# Patient Record
Sex: FEMALE | Race: WHITE | NOT HISPANIC OR LATINO | Employment: FULL TIME | URBAN - METROPOLITAN AREA
[De-identification: names, ages, dates, MRNs, and addresses within clinical notes are randomized per-mention and may not be internally consistent; named-entity substitution may affect disease eponyms.]

---

## 2017-05-01 ENCOUNTER — TRANSCRIBE ORDERS (OUTPATIENT)
Dept: ADMINISTRATIVE | Facility: HOSPITAL | Age: 22
End: 2017-05-01

## 2017-05-01 ENCOUNTER — ALLSCRIPTS OFFICE VISIT (OUTPATIENT)
Dept: OTHER | Facility: OTHER | Age: 22
End: 2017-05-01

## 2017-05-01 DIAGNOSIS — M25.512 PAIN IN LEFT SHOULDER: ICD-10-CM

## 2017-05-01 DIAGNOSIS — M25.512 LEFT SHOULDER PAIN, UNSPECIFIED CHRONICITY: Primary | ICD-10-CM

## 2017-06-12 ENCOUNTER — HOSPITAL ENCOUNTER (OUTPATIENT)
Dept: RADIOLOGY | Facility: HOSPITAL | Age: 22
Discharge: HOME | End: 2017-06-12
Attending: ORTHOPAEDIC SURGERY
Payer: COMMERCIAL

## 2017-06-12 DIAGNOSIS — M25.512 PAIN IN LEFT SHOULDER: ICD-10-CM

## 2017-06-12 DIAGNOSIS — M25.512 LEFT SHOULDER PAIN, UNSPECIFIED CHRONICITY: ICD-10-CM

## 2017-06-12 PROCEDURE — 23350 INJECTION FOR SHOULDER X-RAY: CPT

## 2017-06-12 PROCEDURE — 77002 NEEDLE LOCALIZATION BY XRAY: CPT

## 2017-06-12 PROCEDURE — 73222 MRI JOINT UPR EXTREM W/DYE: CPT

## 2017-06-12 PROCEDURE — A9585 GADOBUTROL INJECTION: HCPCS | Performed by: ORTHOPAEDIC SURGERY

## 2017-06-12 RX ORDER — LIDOCAINE HYDROCHLORIDE 10 MG/ML
5 INJECTION, SOLUTION INFILTRATION; PERINEURAL
Status: COMPLETED | OUTPATIENT
Start: 2017-06-12 | End: 2017-06-12

## 2017-06-12 RX ORDER — 0.9 % SODIUM CHLORIDE 0.9 %
5 VIAL (ML) INJECTION
Status: COMPLETED | OUTPATIENT
Start: 2017-06-12 | End: 2017-06-12

## 2017-06-12 RX ADMIN — LIDOCAINE HYDROCHLORIDE 2 ML: 10 INJECTION, SOLUTION INFILTRATION; PERINEURAL at 15:45

## 2017-06-12 RX ADMIN — GADOBUTROL 0.2 ML: 604.72 INJECTION INTRAVENOUS at 16:56

## 2017-06-12 RX ADMIN — IOHEXOL 3 ML: 300 INJECTION, SOLUTION INTRAVENOUS at 15:45

## 2017-06-12 RX ADMIN — SODIUM CHLORIDE 3 ML: 9 INJECTION, SOLUTION INTRAMUSCULAR; INTRAVENOUS; SUBCUTANEOUS at 15:45

## 2017-07-03 ENCOUNTER — ALLSCRIPTS OFFICE VISIT (OUTPATIENT)
Dept: OTHER | Facility: OTHER | Age: 22
End: 2017-07-03

## 2017-07-03 DIAGNOSIS — M75.42 IMPINGEMENT SYNDROME OF LEFT SHOULDER: ICD-10-CM

## 2017-10-30 ENCOUNTER — TRANSCRIBE ORDERS (OUTPATIENT)
Dept: ADMINISTRATIVE | Facility: HOSPITAL | Age: 22
End: 2017-10-30

## 2017-10-30 DIAGNOSIS — J01.90 ACUTE NON-RECURRENT SINUSITIS, UNSPECIFIED LOCATION: Primary | ICD-10-CM

## 2017-11-24 ENCOUNTER — HOSPITAL ENCOUNTER (OUTPATIENT)
Dept: CT IMAGING | Facility: CLINIC | Age: 22
Discharge: HOME | End: 2017-11-24
Payer: COMMERCIAL

## 2017-11-24 DIAGNOSIS — J01.90 ACUTE NON-RECURRENT SINUSITIS, UNSPECIFIED LOCATION: ICD-10-CM

## 2017-11-24 PROCEDURE — 70486 CT MAXILLOFACIAL W/O DYE: CPT

## 2018-01-13 VITALS
DIASTOLIC BLOOD PRESSURE: 76 MMHG | SYSTOLIC BLOOD PRESSURE: 117 MMHG | WEIGHT: 48.38 LBS | HEART RATE: 77 BPM | BODY MASS INDEX: 8.3 KG/M2

## 2018-01-14 VITALS
WEIGHT: 149.5 LBS | BODY MASS INDEX: 25.66 KG/M2 | HEART RATE: 75 BPM | DIASTOLIC BLOOD PRESSURE: 76 MMHG | SYSTOLIC BLOOD PRESSURE: 108 MMHG

## 2020-07-20 ENCOUNTER — TELEPHONE (OUTPATIENT)
Dept: OTOLARYNGOLOGY | Facility: CLINIC | Age: 25
End: 2020-07-20

## 2020-07-20 NOTE — TELEPHONE ENCOUNTER
Patient called here stating that she needs a f/u appt with Dr Ramila Gonzalez  Patient was told by the [facility] office in [place] to call & get a f/u appt with JCB  However, the patient has called & left several messages for the past two weeks & no one has returned the phone call  Patient would be willing to do a virtual visit with JCB at [facility] and the patient is also willing to do an in-person visit here in [address]  Please contact this patient so that an appointment can be made sooner rather then later

## 2020-07-20 NOTE — TELEPHONE ENCOUNTER
I am not sure why this went to Dr Malachi Jones in the allergy division  Do you need me to see her?   Brannon Jacinto, DO

## 2021-05-24 ENCOUNTER — TELEPHONE (OUTPATIENT)
Dept: ENDOCRINOLOGY | Facility: HOSPITAL | Age: 26
End: 2021-05-24

## 2021-05-24 NOTE — TELEPHONE ENCOUNTER
Pt has 8/25 new patient appt for thyroid  She lives in [place] (her parents live here) and her pcp in [place] believes she should see an Endo ASAP  She also is hoping to try to get pregnant soon so she would like sooner appt  Her recent labs are in [place] thru [facility]  She had them done 4/28 & 5/7  Does she need sooner appt   She is already on Cancellation list

## 2021-05-25 NOTE — TELEPHONE ENCOUNTER
Yes she should probably be seen sooner  Cancellation list sounds good  I can add her on in July  I am not sure I can add her on in June unless there is an opening since I will be covering [facility]  Any openings in other attendings schedule such as CV?

## 2021-05-26 ENCOUNTER — OFFICE VISIT (OUTPATIENT)
Dept: ENDOCRINOLOGY | Facility: HOSPITAL | Age: 26
End: 2021-05-26
Payer: COMMERCIAL

## 2021-05-26 ENCOUNTER — APPOINTMENT (OUTPATIENT)
Dept: LAB | Facility: HOSPITAL | Age: 26
End: 2021-05-26
Attending: INTERNAL MEDICINE
Payer: COMMERCIAL

## 2021-05-26 VITALS
WEIGHT: 155.2 LBS | SYSTOLIC BLOOD PRESSURE: 108 MMHG | DIASTOLIC BLOOD PRESSURE: 72 MMHG | HEIGHT: 64 IN | HEART RATE: 96 BPM | BODY MASS INDEX: 26.5 KG/M2

## 2021-05-26 DIAGNOSIS — E05.90 HYPERTHYROIDISM: Primary | ICD-10-CM

## 2021-05-26 LAB
ALBUMIN SERPL BCP-MCNC: 4.1 G/DL (ref 3.5–5)
ALP SERPL-CCNC: 96 U/L (ref 46–116)
ALT SERPL W P-5'-P-CCNC: 45 U/L (ref 12–78)
ANION GAP SERPL CALCULATED.3IONS-SCNC: 5 MMOL/L (ref 4–13)
AST SERPL W P-5'-P-CCNC: 22 U/L (ref 5–45)
BASOPHILS # BLD AUTO: 0.05 THOUSANDS/ΜL (ref 0–0.1)
BASOPHILS NFR BLD AUTO: 1 % (ref 0–1)
BILIRUB SERPL-MCNC: 0.52 MG/DL (ref 0.2–1)
BUN SERPL-MCNC: 13 MG/DL (ref 5–25)
CALCIUM SERPL-MCNC: 9.6 MG/DL (ref 8.3–10.1)
CHLORIDE SERPL-SCNC: 110 MMOL/L (ref 100–108)
CO2 SERPL-SCNC: 26 MMOL/L (ref 21–32)
CREAT SERPL-MCNC: 0.68 MG/DL (ref 0.6–1.3)
EOSINOPHIL # BLD AUTO: 0.3 THOUSAND/ΜL (ref 0–0.61)
EOSINOPHIL NFR BLD AUTO: 7 % (ref 0–6)
ERYTHROCYTE [DISTWIDTH] IN BLOOD BY AUTOMATED COUNT: 12.9 % (ref 11.6–15.1)
GFR SERPL CREATININE-BSD FRML MDRD: 120 ML/MIN/1.73SQ M
GLUCOSE P FAST SERPL-MCNC: 92 MG/DL (ref 65–99)
HCT VFR BLD AUTO: 43.6 % (ref 34.8–46.1)
HGB BLD-MCNC: 14.2 G/DL (ref 11.5–15.4)
IMM GRANULOCYTES # BLD AUTO: 0.01 THOUSAND/UL (ref 0–0.2)
IMM GRANULOCYTES NFR BLD AUTO: 0 % (ref 0–2)
LYMPHOCYTES # BLD AUTO: 1.76 THOUSANDS/ΜL (ref 0.6–4.47)
LYMPHOCYTES NFR BLD AUTO: 41 % (ref 14–44)
MCH RBC QN AUTO: 29.2 PG (ref 26.8–34.3)
MCHC RBC AUTO-ENTMCNC: 32.6 G/DL (ref 31.4–37.4)
MCV RBC AUTO: 90 FL (ref 82–98)
MONOCYTES # BLD AUTO: 0.33 THOUSAND/ΜL (ref 0.17–1.22)
MONOCYTES NFR BLD AUTO: 8 % (ref 4–12)
NEUTROPHILS # BLD AUTO: 1.84 THOUSANDS/ΜL (ref 1.85–7.62)
NEUTS SEG NFR BLD AUTO: 43 % (ref 43–75)
NRBC BLD AUTO-RTO: 0 /100 WBCS
PLATELET # BLD AUTO: 263 THOUSANDS/UL (ref 149–390)
PMV BLD AUTO: 11.1 FL (ref 8.9–12.7)
POTASSIUM SERPL-SCNC: 4 MMOL/L (ref 3.5–5.3)
PROT SERPL-MCNC: 7.8 G/DL (ref 6.4–8.2)
RBC # BLD AUTO: 4.87 MILLION/UL (ref 3.81–5.12)
SODIUM SERPL-SCNC: 141 MMOL/L (ref 136–145)
T3FREE SERPL-MCNC: 5.43 PG/ML (ref 2.3–4.2)
T4 FREE SERPL-MCNC: 1.21 NG/DL (ref 0.76–1.46)
TSH SERPL DL<=0.05 MIU/L-ACNC: <0.007 UIU/ML (ref 0.36–3.74)
WBC # BLD AUTO: 4.29 THOUSAND/UL (ref 4.31–10.16)

## 2021-05-26 PROCEDURE — 80053 COMPREHEN METABOLIC PANEL: CPT | Performed by: INTERNAL MEDICINE

## 2021-05-26 PROCEDURE — 84443 ASSAY THYROID STIM HORMONE: CPT | Performed by: INTERNAL MEDICINE

## 2021-05-26 PROCEDURE — 36415 COLL VENOUS BLD VENIPUNCTURE: CPT | Performed by: INTERNAL MEDICINE

## 2021-05-26 PROCEDURE — 84445 ASSAY OF TSI GLOBULIN: CPT | Performed by: INTERNAL MEDICINE

## 2021-05-26 PROCEDURE — 99204 OFFICE O/P NEW MOD 45 MIN: CPT | Performed by: INTERNAL MEDICINE

## 2021-05-26 PROCEDURE — 84439 ASSAY OF FREE THYROXINE: CPT | Performed by: INTERNAL MEDICINE

## 2021-05-26 PROCEDURE — 85025 COMPLETE CBC W/AUTO DIFF WBC: CPT | Performed by: INTERNAL MEDICINE

## 2021-05-26 PROCEDURE — 84481 FREE ASSAY (FT-3): CPT | Performed by: INTERNAL MEDICINE

## 2021-05-26 RX ORDER — CETIRIZINE HYDROCHLORIDE 10 MG/1
10 TABLET ORAL DAILY
COMMUNITY

## 2021-05-26 NOTE — PROGRESS NOTES
5/26/2021    Assessment/Plan      Diagnoses and all orders for this visit:    Hyperthyroidism  -     TSH, 3rd generation  -     T3, free  -     T4, free  -     Thyroid stimulating immunoglobulin  -     Comprehensive metabolic panel Lab Collect  -     CBC and differential- Lab Collect    Other orders  -     cetirizine (ZyrTEC) 10 mg tablet; Take 10 mg by mouth daily        Assessment/Plan:  Thyrotoxicosis: T3   Toxicosis given lab work as noted below  My suspicion is this may be driven by Graves disease as the etiology  However we will pursue additional workup in order labs as ordered above  If TSI is elevated, given plans for pregnancy we will start medical therapy  Discussed considerations in terms of medications at different trimesters of pregnancy and also thyroid levels during and before pregnancy  Advised her to continue to hold off on trying for pregnancy at this time until her thyroid levels are improved  Will be in touch with her as results are available  Discussed with her that if TSI is normal we may need to obtain a thyroid uptake and scan  CC: New Patient    History of Present Illness     HPI: Ad Kwon is a 31y o  year old female Presents to establish care for recent diagnosis of hyperthyroidism seen in lab work  She reports that her symptom of concern was mainly joint pain and hand pain  Otherwise overall she is feeling well  There has no appreciable significant fatigue, heat intolerance, tremor, palpitations, tachycardia, anxiety, unintentional weight loss though   She does note at times later in the day energy levels are lower  She denies any neck compressive symptoms  She denies any eye discomfort, vision changes  She denies smoking  There is a family history of hypothyroidism  No known events such as viral illness such as life stressor prior to the symptoms and lab work noted below    Node change in exercise tolerance though she does report sometimes the in the day given energy level will not exercise  She does report having IUD removed for fertility plans in the near future  She was told by her family doctor to avoid pregnancy at this time until thyroid levels are improved  Review of Systems   Constitutional: Positive for fatigue  HENT: Negative for trouble swallowing and voice change  Eyes: Negative for visual disturbance  Respiratory: Negative for shortness of breath  Cardiovascular: Negative for palpitations and leg swelling  Gastrointestinal: Negative for abdominal pain, nausea and vomiting  Endocrine: Negative for polydipsia and polyuria  Musculoskeletal: Positive for arthralgias  Skin: Negative for rash  Neurological: Negative for dizziness, tremors and weakness  Hematological: Negative for adenopathy  Psychiatric/Behavioral: Negative for agitation and confusion  Historical Information   History reviewed  No pertinent past medical history  Past Surgical History:   Procedure Laterality Date    SEPTOPLASTY  02/08/2019     Social History   Social History     Substance and Sexual Activity   Alcohol Use Yes    Frequency: 2-4 times a month     Social History     Substance and Sexual Activity   Drug Use Never     Social History     Tobacco Use   Smoking Status Never Smoker   Smokeless Tobacco Never Used     Family History:   Family History   Problem Relation Age of Onset    Hypertension Mother     Hyperlipidemia Mother     Asthma Mother     Hypertension Father     Hyperlipidemia Father        Meds/Allergies   Current Outpatient Medications   Medication Sig Dispense Refill    cetirizine (ZyrTEC) 10 mg tablet Take 10 mg by mouth daily       No current facility-administered medications for this visit  Allergies   Allergen Reactions    Amitriptyline      Tremors         Objective   Vitals: Blood pressure 108/72, pulse 96, height 5' 4" (1 626 m), weight 70 4 kg (155 lb 3 2 oz)    Invasive Devices     None                 Physical Exam  Constitutional:       General: She is not in acute distress  Appearance: She is well-developed  She is not diaphoretic  HENT:      Head: Normocephalic and atraumatic  Eyes:      Conjunctiva/sclera: Conjunctivae normal       Pupils: Pupils are equal, round, and reactive to light  Neck:      Musculoskeletal: Normal range of motion and neck supple  Thyroid: No thyromegaly  Comments:  Right lobe of thyroid  Feels firmer and slightly more prominent but no appreciable nodule noted on palpation  Cardiovascular:      Rate and Rhythm: Normal rate and regular rhythm  Pulmonary:      Effort: Pulmonary effort is normal  No respiratory distress  Breath sounds: Normal breath sounds  Abdominal:      General: Bowel sounds are normal       Palpations: Abdomen is soft  Tenderness: There is no abdominal tenderness  Musculoskeletal: Normal range of motion  Skin:     General: Skin is warm and dry  Findings: No rash  Neurological:      Mental Status: She is alert and oriented to person, place, and time  Motor: No abnormal muscle tone  Psychiatric:         Behavior: Behavior normal          The history was obtained from the review of the chart and from the patient  Lab Results:        Labs from 05/07/2021:   TPO antibody 621, free T3 5 4, TSH less than 0 008     Labs from 04/28/2021:   TSH less than 0 008, free T4 1 42      Future Appointments   Date Time Provider Getachew Cruz   11/24/2021  1:50 PM Shannan Sol DO [facility]       Portions of the record may have been created with voice recognition software  Occasional wrong word or "sound a like" substitutions may have occurred due to the inherent limitations of voice recognition software  Read the chart carefully and recognize, using context, where substitutions have occurred

## 2021-05-28 DIAGNOSIS — E05.90 HYPERTHYROIDISM: Primary | ICD-10-CM

## 2021-05-28 LAB — TSI SER-ACNC: 4.8 IU/L (ref 0–0.55)

## 2021-05-28 RX ORDER — PROPYLTHIOURACIL 50 MG/1
TABLET ORAL
Qty: 60 TABLET | Refills: 3 | Status: SHIPPED | OUTPATIENT
Start: 2021-05-28 | End: 2021-06-30 | Stop reason: SDUPTHER

## 2021-06-03 ENCOUNTER — PATIENT MESSAGE (OUTPATIENT)
Dept: ENDOCRINOLOGY | Facility: HOSPITAL | Age: 26
End: 2021-06-03

## 2021-09-27 ENCOUNTER — PATIENT MESSAGE (OUTPATIENT)
Dept: ENDOCRINOLOGY | Facility: HOSPITAL | Age: 26
End: 2021-09-27

## 2021-10-18 ENCOUNTER — TELEPHONE (OUTPATIENT)
Dept: ENDOCRINOLOGY | Facility: HOSPITAL | Age: 26
End: 2021-10-18

## 2021-10-18 DIAGNOSIS — E05.90 HYPERTHYROIDISM: Primary | ICD-10-CM

## 2021-10-28 ENCOUNTER — TELEPHONE (OUTPATIENT)
Dept: ENDOCRINOLOGY | Facility: HOSPITAL | Age: 26
End: 2021-10-28

## 2021-11-16 ENCOUNTER — TELEPHONE (OUTPATIENT)
Dept: ENDOCRINOLOGY | Facility: HOSPITAL | Age: 26
End: 2021-11-16

## 2021-11-16 DIAGNOSIS — E05.90 HYPERTHYROIDISM: Primary | ICD-10-CM

## 2021-11-24 ENCOUNTER — OFFICE VISIT (OUTPATIENT)
Dept: ENDOCRINOLOGY | Facility: HOSPITAL | Age: 26
End: 2021-11-24
Payer: COMMERCIAL

## 2021-11-24 VITALS
BODY MASS INDEX: 27.31 KG/M2 | SYSTOLIC BLOOD PRESSURE: 110 MMHG | WEIGHT: 160 LBS | HEIGHT: 64 IN | DIASTOLIC BLOOD PRESSURE: 74 MMHG | HEART RATE: 82 BPM

## 2021-11-24 DIAGNOSIS — O99.281 HYPERTHYROIDISM AFFECTING PREGNANCY IN FIRST TRIMESTER: Primary | ICD-10-CM

## 2021-11-24 DIAGNOSIS — E05.90 HYPERTHYROIDISM AFFECTING PREGNANCY IN FIRST TRIMESTER: Primary | ICD-10-CM

## 2021-11-24 PROCEDURE — 99214 OFFICE O/P EST MOD 30 MIN: CPT | Performed by: INTERNAL MEDICINE

## 2022-02-10 ENCOUNTER — APPOINTMENT (OUTPATIENT)
Dept: LAB | Facility: MEDICAL CENTER | Age: 27
End: 2022-02-10
Payer: COMMERCIAL

## 2022-02-10 DIAGNOSIS — E05.90 HYPERTHYROIDISM AFFECTING PREGNANCY IN SECOND TRIMESTER: ICD-10-CM

## 2022-02-10 DIAGNOSIS — O99.282 HYPERTHYROIDISM AFFECTING PREGNANCY IN SECOND TRIMESTER: ICD-10-CM

## 2022-02-10 LAB
T3FREE SERPL-MCNC: 2.59 PG/ML (ref 2.3–4.2)
T4 FREE SERPL-MCNC: 0.69 NG/DL (ref 0.76–1.46)
T4 SERPL-MCNC: 9 UG/DL (ref 4.7–13.3)
TSH SERPL DL<=0.05 MIU/L-ACNC: 1.68 UIU/ML (ref 0.36–3.74)

## 2022-02-10 PROCEDURE — 84445 ASSAY OF TSI GLOBULIN: CPT

## 2022-02-10 PROCEDURE — 36415 COLL VENOUS BLD VENIPUNCTURE: CPT

## 2022-02-10 PROCEDURE — 84481 FREE ASSAY (FT-3): CPT

## 2022-02-10 PROCEDURE — 83520 IMMUNOASSAY QUANT NOS NONAB: CPT

## 2022-02-10 PROCEDURE — 84443 ASSAY THYROID STIM HORMONE: CPT

## 2022-02-10 PROCEDURE — 84439 ASSAY OF FREE THYROXINE: CPT

## 2022-02-12 LAB — TSI SER-ACNC: 1.16 IU/L (ref 0–0.55)

## 2022-02-16 LAB — MISCELLANEOUS LAB TEST RESULT: NORMAL

## 2022-04-08 ENCOUNTER — TELEPHONE (OUTPATIENT)
Dept: ENDOCRINOLOGY | Facility: HOSPITAL | Age: 27
End: 2022-04-08

## 2022-04-08 DIAGNOSIS — E05.90 HYPERTHYROIDISM AFFECTING PREGNANCY IN SECOND TRIMESTER: Primary | ICD-10-CM

## 2022-04-08 DIAGNOSIS — O99.282 HYPERTHYROIDISM AFFECTING PREGNANCY IN SECOND TRIMESTER: Primary | ICD-10-CM

## 2022-04-08 NOTE — TELEPHONE ENCOUNTER
Please let patient know that thyroid blood work looks great from 04/04  Suggest repeating labs again in 4 weeks  04/04/2022:   TSH 1 491, free T4 0 65, free T3-3 point 0, total T4 7 4

## 2022-04-13 ENCOUNTER — OFFICE VISIT (OUTPATIENT)
Dept: ENDOCRINOLOGY | Facility: HOSPITAL | Age: 27
End: 2022-04-13
Payer: COMMERCIAL

## 2022-04-13 VITALS
WEIGHT: 193.6 LBS | HEART RATE: 108 BPM | DIASTOLIC BLOOD PRESSURE: 70 MMHG | BODY MASS INDEX: 33.05 KG/M2 | OXYGEN SATURATION: 98 % | HEIGHT: 64 IN | SYSTOLIC BLOOD PRESSURE: 116 MMHG

## 2022-04-13 DIAGNOSIS — E05.90 HYPERTHYROIDISM AFFECTING PREGNANCY: Primary | ICD-10-CM

## 2022-04-13 DIAGNOSIS — O99.280 HYPERTHYROIDISM AFFECTING PREGNANCY: Primary | ICD-10-CM

## 2022-04-13 PROCEDURE — 99214 OFFICE O/P EST MOD 30 MIN: CPT | Performed by: INTERNAL MEDICINE

## 2022-04-13 RX ORDER — POLYETHYLENE GLYCOL 3350 17 G/17G
17 POWDER, FOR SOLUTION ORAL DAILY
COMMUNITY

## 2022-04-13 RX ORDER — FERROUS SULFATE 325(65) MG
325 TABLET ORAL
COMMUNITY

## 2022-04-13 RX ORDER — METHIMAZOLE 5 MG/1
TABLET ORAL
Qty: 30 TABLET | Refills: 5 | Status: SHIPPED | OUTPATIENT
Start: 2022-04-13 | End: 2022-06-02

## 2022-04-13 NOTE — PROGRESS NOTES
4/13/2022    Assessment/Plan      Diagnoses and all orders for this visit:    Hyperthyroidism affecting pregnancy  -     TSH, 3rd generation; Future  -     T3, free; Future  -     T4, free; Future  -     Thyroid stimulating immunoglobulin; Future  -     TRAb (TSH Receptor Binding Antibody); Future  -     T4- Lab Collect; Future  -     methimazole (TAPAZOLE) 5 mg tablet; 1 tab daily  -     TSH, 3rd generation; Future  -     T3, free; Future  -     T4, free; Future  -     Thyroid stimulating immunoglobulin; Future    Other orders  -     polyethylene glycol (MIRALAX) 17 g packet; Take 17 g by mouth daily  -     Docusate Sodium (COLACE PO); Take by mouth  -     Famotidine (PEPCID PO); Take by mouth  -     ferrous sulfate 325 (65 Fe) mg tablet; Take 325 mg by mouth daily with breakfast        Assessment/Plan:  Hyperthyroidism in pregnancy:  Remains euthyroid off of antithyroid medications  Discussed considerations regarding relapse after delivery  For now I would suggest we continue monitor without medication during pregnancy  After delivery would restart antithyroid medication with methimazole 5 mg daily  We reviewed lactation information in this regard  Would repeat thyroid blood work 2 weeks after delivery at that time  Follow-up in the office in 4 months  ADDENDUM 6/8/22: Recommend resuming methimazole 5 mg daily within 24 hour of delivery and repeating thyroid labs as ordered 2 weeks after delivery  CC: Follow-up    History of Present Illness     HPI: Grzegorz Mcclain is a 28y o  year old female with history of hyperthyroidism due to Graves disease who presents for a follow-up appointment currently at 32 week gestation  In early pregnancy were able to discontinue antithyroid medication blood work as remains stable  She has had increased TSH receptor antibodies and TSI levels and is being monitor closely by MFM  Presents today overall feeling okay    She is undergoing evaluation by Cardiology tomorrow for tachycardia  Her due date is in June  No neck compressive symptoms  Review of Systems   Constitutional: Negative for fatigue  HENT: Negative for trouble swallowing and voice change  Eyes: Negative for visual disturbance  Respiratory: Negative for shortness of breath  Cardiovascular: Positive for palpitations and leg swelling  Gastrointestinal: Negative for abdominal pain, nausea and vomiting  Endocrine: Positive for heat intolerance  Negative for polydipsia and polyuria  Musculoskeletal: Negative for arthralgias and myalgias  Skin: Negative for rash  Neurological: Negative for dizziness, tremors and weakness  Hematological: Negative for adenopathy  Psychiatric/Behavioral: Negative for agitation and confusion  Historical Information   History reviewed  No pertinent past medical history  Past Surgical History:   Procedure Laterality Date    SEPTOPLASTY  02/08/2019     Social History   Social History     Substance and Sexual Activity   Alcohol Use Yes     Social History     Substance and Sexual Activity   Drug Use Never     Social History     Tobacco Use   Smoking Status Never Smoker   Smokeless Tobacco Never Used     Family History:   Family History   Problem Relation Age of Onset    Hypertension Mother     Hyperlipidemia Mother     Asthma Mother     Hypertension Father     Hyperlipidemia Father        Meds/Allergies   Current Outpatient Medications   Medication Sig Dispense Refill    cetirizine (ZyrTEC) 10 mg tablet Take 10 mg by mouth daily        Docusate Sodium (COLACE PO) Take by mouth      Famotidine (PEPCID PO) Take by mouth      ferrous sulfate 325 (65 Fe) mg tablet Take 325 mg by mouth daily with breakfast      polyethylene glycol (MIRALAX) 17 g packet Take 17 g by mouth daily      PRENATAL W/O A VIT-FE FUM-FA PO Take by mouth      methimazole (TAPAZOLE) 5 mg tablet 1 tab daily 30 tablet 5     No current facility-administered medications for this visit  Allergies   Allergen Reactions    Amitriptyline      Tremors         Objective   Vitals: Blood pressure 116/70, pulse (!) 108, height 5' 4" (1 626 m), weight 87 8 kg (193 lb 9 6 oz), last menstrual period 08/01/2021, SpO2 98 %  Invasive Devices  Report    None                 Physical Exam  Vitals reviewed  Constitutional:       General: She is not in acute distress  Appearance: She is well-developed  She is not diaphoretic  HENT:      Head: Normocephalic and atraumatic  Eyes:      Conjunctiva/sclera: Conjunctivae normal       Pupils: Pupils are equal, round, and reactive to light  Neck:      Thyroid: No thyromegaly  Cardiovascular:      Rate and Rhythm: Normal rate and regular rhythm  Pulmonary:      Effort: Pulmonary effort is normal  No respiratory distress  Breath sounds: Normal breath sounds  Abdominal:      General: Bowel sounds are normal       Palpations: Abdomen is soft  Musculoskeletal:         General: Normal range of motion  Cervical back: Normal range of motion and neck supple  Skin:     General: Skin is warm and dry  Findings: No rash  Neurological:      Mental Status: She is alert and oriented to person, place, and time  Motor: No abnormal muscle tone  Psychiatric:         Behavior: Behavior normal          The history was obtained from the review of the chart and from the patient  Lab Results:      Labs from 04/04/2022:   Free T4 0 65, free T3 3 0, TSH 1 491, TSA less than 89    No future appointments  Portions of the record may have been created with voice recognition software  Occasional wrong word or "sound a like" substitutions may have occurred due to the inherent limitations of voice recognition software  Read the chart carefully and recognize, using context, where substitutions have occurred

## 2022-06-02 DIAGNOSIS — E05.90 HYPERTHYROIDISM AFFECTING PREGNANCY: ICD-10-CM

## 2022-06-02 DIAGNOSIS — O99.280 HYPERTHYROIDISM AFFECTING PREGNANCY: ICD-10-CM

## 2022-06-02 RX ORDER — METHIMAZOLE 5 MG/1
TABLET ORAL
Qty: 90 TABLET | Refills: 0 | Status: SHIPPED | OUTPATIENT
Start: 2022-06-02 | End: 2022-08-05 | Stop reason: SDUPTHER

## 2022-06-27 DIAGNOSIS — E05.90 HYPERTHYROIDISM: Primary | ICD-10-CM

## 2022-08-05 ENCOUNTER — OFFICE VISIT (OUTPATIENT)
Dept: ENDOCRINOLOGY | Facility: CLINIC | Age: 27
End: 2022-08-05
Payer: COMMERCIAL

## 2022-08-05 VITALS
WEIGHT: 182 LBS | HEIGHT: 64 IN | HEART RATE: 88 BPM | SYSTOLIC BLOOD PRESSURE: 110 MMHG | BODY MASS INDEX: 31.07 KG/M2 | DIASTOLIC BLOOD PRESSURE: 70 MMHG

## 2022-08-05 DIAGNOSIS — E05.90 HYPERTHYROIDISM AFFECTING PREGNANCY: ICD-10-CM

## 2022-08-05 DIAGNOSIS — E05.90 HYPERTHYROIDISM: Primary | ICD-10-CM

## 2022-08-05 DIAGNOSIS — O99.280 HYPERTHYROIDISM AFFECTING PREGNANCY: ICD-10-CM

## 2022-08-05 PROCEDURE — 99213 OFFICE O/P EST LOW 20 MIN: CPT | Performed by: INTERNAL MEDICINE

## 2022-08-05 RX ORDER — METHIMAZOLE 5 MG/1
TABLET ORAL
Qty: 90 TABLET | Refills: 3 | Status: SHIPPED | OUTPATIENT
Start: 2022-08-05

## 2022-08-05 NOTE — PROGRESS NOTES
8/5/2022    Assessment/Plan      Diagnoses and all orders for this visit:    Hyperthyroidism  -     TSH, 3rd generation; Future  -     T3, free; Future  -     T4, free; Future  -     Thyroid stimulating immunoglobulin; Future  -     Comprehensive metabolic panel Lab Collect; Future  -     CBC and differential- Lab Collect; Future        Assessment/Plan:  Hyperthyroidism due to Graves disease: Clinically biochemically overall doing well  Continue methimazole 5 mg daily  Repeat labs in 3 months and we will call with the results  Patient will contact the office with any questions or concerns in the meantime  CC: Follow-up    History of Present Illness     HPI: Jaziel Das is a 28y o  year old female with history of for thyroidism to Graves disease who presents for a follow-up appointment  She presents after delivering her baby while off of antithyroid medications during pregnancy  She delivered about 6 weeks ago  Every we elected to restart methimazole 5 mg daily  She presents today for follow-up visit  Overall she feels well  No concerning symptoms of hyperthyroidism  No neck compressive symptoms  Review of Systems   Constitutional: Negative for fatigue  HENT: Negative for trouble swallowing and voice change  Eyes: Negative for visual disturbance  Respiratory: Negative for shortness of breath  Cardiovascular: Negative for palpitations and leg swelling  Gastrointestinal: Negative for abdominal pain, nausea and vomiting  Endocrine: Negative for polydipsia and polyuria  Musculoskeletal: Negative for arthralgias and myalgias  Skin: Negative for rash  Neurological: Negative for dizziness, tremors and weakness  Hematological: Negative for adenopathy  Psychiatric/Behavioral: Negative for agitation and confusion  Historical Information   History reviewed  No pertinent past medical history    Past Surgical History:   Procedure Laterality Date    SEPTOPLASTY  02/08/2019 Social History   Social History     Substance and Sexual Activity   Alcohol Use Yes     Social History     Substance and Sexual Activity   Drug Use Never     Social History     Tobacco Use   Smoking Status Never Smoker   Smokeless Tobacco Never Used     Family History:   Family History   Problem Relation Age of Onset    Hypertension Mother     Hyperlipidemia Mother     Asthma Mother     Hypertension Father     Hyperlipidemia Father        Meds/Allergies   Current Outpatient Medications   Medication Sig Dispense Refill    cetirizine (ZyrTEC) 10 mg tablet Take 10 mg by mouth daily        Docusate Sodium (COLACE PO) Take by mouth      Famotidine (PEPCID PO) Take by mouth      ferrous sulfate 325 (65 Fe) mg tablet Take 325 mg by mouth daily with breakfast      methimazole (TAPAZOLE) 5 mg tablet TAKE 1 TABLET BY MOUTH EVERY DAY 90 tablet 0    polyethylene glycol (MIRALAX) 17 g packet Take 17 g by mouth daily      PRENATAL W/O A VIT-FE FUM-FA PO Take by mouth       No current facility-administered medications for this visit  Allergies   Allergen Reactions    Amitriptyline      Tremors         Objective   Vitals: Blood pressure 110/70, pulse 88, height 5' 4" (1 626 m), weight 82 6 kg (182 lb), last menstrual period 08/01/2021  Invasive Devices  Report    None                 Physical Exam  Vitals reviewed  Constitutional:       General: She is not in acute distress  Appearance: She is well-developed  She is not diaphoretic  HENT:      Head: Normocephalic and atraumatic  Eyes:      Conjunctiva/sclera: Conjunctivae normal       Pupils: Pupils are equal, round, and reactive to light  Neck:      Thyroid: No thyromegaly  Cardiovascular:      Rate and Rhythm: Normal rate and regular rhythm  Pulmonary:      Effort: Pulmonary effort is normal  No respiratory distress  Breath sounds: Normal breath sounds  Abdominal:      General: Bowel sounds are normal       Palpations: Abdomen is soft  Musculoskeletal:         General: Normal range of motion  Cervical back: Normal range of motion and neck supple  Skin:     General: Skin is warm and dry  Findings: No rash  Neurological:      Mental Status: She is alert and oriented to person, place, and time  Motor: No abnormal muscle tone  Psychiatric:         Behavior: Behavior normal          The history was obtained from the review of the chart and from the patient  Lab Results:      Labs from 07/29/2022:   TSI less than 89, TSH 3 141, free T4 0 96, free T3 3 3      No future appointments  Portions of the record may have been created with voice recognition software  Occasional wrong word or "sound a like" substitutions may have occurred due to the inherent limitations of voice recognition software  Read the chart carefully and recognize, using context, where substitutions have occurred

## 2022-12-12 DIAGNOSIS — E05.90 HYPERTHYROIDISM: Primary | ICD-10-CM

## 2023-02-16 ENCOUNTER — TELEPHONE (OUTPATIENT)
Dept: ENDOCRINOLOGY | Facility: CLINIC | Age: 28
End: 2023-02-16

## 2023-02-16 NOTE — TELEPHONE ENCOUNTER
Patient stating that she is not feeling well and is asking for a virtual visit for tomorrow  Is it ok?   Thank you

## 2023-02-17 ENCOUNTER — TELEMEDICINE (OUTPATIENT)
Dept: ENDOCRINOLOGY | Facility: CLINIC | Age: 28
End: 2023-02-17

## 2023-02-17 DIAGNOSIS — E05.90 HYPERTHYROIDISM: Primary | ICD-10-CM

## 2023-02-17 DIAGNOSIS — E05.90 HYPERTHYROIDISM AFFECTING PREGNANCY: ICD-10-CM

## 2023-02-17 DIAGNOSIS — O99.280 HYPERTHYROIDISM AFFECTING PREGNANCY: ICD-10-CM

## 2023-02-17 RX ORDER — METHIMAZOLE 5 MG/1
TABLET ORAL
Qty: 90 TABLET | Refills: 3 | Status: SHIPPED | OUTPATIENT
Start: 2023-02-17

## 2023-02-17 NOTE — PROGRESS NOTES
Virtual Regular Visit    Verification of patient location:    Patient is located in the following state in which I hold an active license [place]      Assessment/Plan:    Problem List Items Addressed This Visit        Endocrine    Hyperthyroidism - Primary    Relevant Medications    methimazole (TAPAZOLE) 5 mg tablet    Other Relevant Orders    TSH, 3rd generation    T3, free    T4, free    Thyroid stimulating immunoglobulin    Comprehensive metabolic panel    CBC and differential   Other Visit Diagnoses     Hyperthyroidism affecting pregnancy        Relevant Medications    methimazole (TAPAZOLE) 5 mg tablet      A/P:  1  Hyperthyroidism due to Graves' disease: Clinically biochemically overall doing well on current regimen  We will continue current regimen and repeat labs in June which would be 6 months from last labs  We will see her in the office soon after that to review results  She will call sooner with any questions or concerns  Reason for visit is   Chief Complaint   Patient presents with   • Virtual Regular Visit        Encounter provider Ирина Pringle DO    Provider located at [address] [place] [address] [address] [id number]      Recent Visits  Date Type Provider Dept   02/16/23 Telephone Ирина Pringle [address] [facility] End[facility] 23   Showing recent visits within past 7 days and meeting all other requirements  Today's Visits  Date Type Provider Dept   02/17/23 Telemedicine Ирина Pringle DO [facility] 23   Showing today's visits and meeting all other requirements  Future Appointments  No visits were found meeting these conditions  Showing future appointments within next 150 days and meeting all other requirements       The patient was identified by name and date of birth   Nelsy Mar was informed that this is a telemedicine visit and that the visit is being conducted through the [employer]  She agrees to proceed     My office door was closed  No one else was in the room  She acknowledged consent and understanding of privacy and security of the video platform  The patient has agreed to participate and understands they can discontinue the visit at any time  Patient is aware this is a billable service  Annelise Hernandez is a 33 y o  female who presents for a follow-up of hyperthyroidism due to Graves' disease  She had recent pregnancy with delivery in June 2022  During pregnancy she is off of antithyroid medications  We then elected to resume antithyroid medications with methimazole 5 mg daily  Going well  Has not had any symptoms of hyperthyroidism study, tremor, shakiness, heat intolerance, sweats, palpitations or tachycardia, neck compressive symptoms, eye discomfort  No side effects from methimazole  Has had a few viral illnesses recently but otherwise doing well  No plans for additional pregnancy at this time and she will keep me posted in this regard  Soundra Eaves HPI     History reviewed  No pertinent past medical history  Past Surgical History:   Procedure Laterality Date   • SEPTOPLASTY  02/08/2019       Current Outpatient Medications   Medication Sig Dispense Refill   • cetirizine (ZyrTEC) 10 mg tablet Take 10 mg by mouth daily       • methimazole (TAPAZOLE) 5 mg tablet TAKE 1 TABLET BY MOUTH EVERY DAY 90 tablet 3     No current facility-administered medications for this visit  Allergies   Allergen Reactions   • Amitriptyline      Tremors         Review of Systems   Constitutional: Negative for fatigue  HENT: Negative for trouble swallowing and voice change  Eyes: Negative for visual disturbance  Respiratory: Negative for shortness of breath  Cardiovascular: Negative for palpitations and leg swelling  Gastrointestinal: Negative for abdominal pain, nausea and vomiting     Endocrine: Negative for polydipsia and polyuria  Musculoskeletal: Negative for arthralgias and myalgias  Skin: Negative for rash  Neurological: Negative for dizziness, tremors and weakness  Hematological: Negative for adenopathy  Psychiatric/Behavioral: Negative for agitation and confusion  Video Exam    There were no vitals filed for this visit  Physical Exam   Physical Exam:  General: No acute distress  Alert and awake  HEENT: Normocephalic, atraumatic  Cardiac: No lower extremity edema  Pulmonary: No respiratory distress  Neuro: Moves all extremities spontaneously      Labs from 12/9/22:  , free t3 3 4, free t4 0 92, TSH 1 33      I spent 12 minutes directly with the patient during this visit

## 2023-09-18 DIAGNOSIS — E05.90 HYPERTHYROIDISM: Primary | ICD-10-CM

## 2023-09-18 DIAGNOSIS — O99.280 HYPERTHYROIDISM AFFECTING PREGNANCY: ICD-10-CM

## 2023-09-18 DIAGNOSIS — E05.90 HYPERTHYROIDISM AFFECTING PREGNANCY: ICD-10-CM

## 2023-09-18 RX ORDER — METHIMAZOLE 5 MG/1
TABLET ORAL
Qty: 90 TABLET | Refills: 3 | Status: SHIPPED | OUTPATIENT
Start: 2023-09-18 | End: 2023-10-09

## 2023-10-09 DIAGNOSIS — E05.90 HYPERTHYROIDISM: Primary | ICD-10-CM

## 2023-10-09 RX ORDER — PROPYLTHIOURACIL 50 MG/1
TABLET ORAL
Qty: 30 TABLET | Refills: 2 | Status: SHIPPED | OUTPATIENT
Start: 2023-10-09 | End: 2023-10-11 | Stop reason: SDUPTHER

## 2023-10-11 DIAGNOSIS — E05.90 HYPERTHYROIDISM: ICD-10-CM

## 2023-10-11 RX ORDER — PROPYLTHIOURACIL 50 MG/1
TABLET ORAL
Qty: 90 TABLET | Refills: 1 | Status: SHIPPED | OUTPATIENT
Start: 2023-10-11

## 2023-12-20 DIAGNOSIS — E05.90 HYPERTHYROIDISM: Primary | ICD-10-CM

## 2024-04-05 DIAGNOSIS — E05.90 HYPERTHYROIDISM: Primary | ICD-10-CM

## 2024-04-06 ENCOUNTER — APPOINTMENT (OUTPATIENT)
Dept: LAB | Facility: MEDICAL CENTER | Age: 29
End: 2024-04-06
Payer: COMMERCIAL

## 2024-04-06 LAB
T3 SERPL-MCNC: 1.7 NG/ML
T3FREE SERPL-MCNC: 4.87 PG/ML (ref 2.5–3.9)
T4 FREE SERPL-MCNC: 0.43 NG/DL (ref 0.61–1.12)
T4 SERPL-MCNC: 8.72 UG/DL (ref 6.09–12.23)
TSH SERPL DL<=0.05 MIU/L-ACNC: 1.88 UIU/ML (ref 0.45–4.5)

## 2024-04-06 PROCEDURE — 36415 COLL VENOUS BLD VENIPUNCTURE: CPT | Performed by: INTERNAL MEDICINE

## 2024-04-06 PROCEDURE — 84445 ASSAY OF TSI GLOBULIN: CPT | Performed by: INTERNAL MEDICINE

## 2024-04-06 PROCEDURE — 84480 ASSAY TRIIODOTHYRONINE (T3): CPT | Performed by: INTERNAL MEDICINE

## 2024-04-06 PROCEDURE — 84439 ASSAY OF FREE THYROXINE: CPT | Performed by: INTERNAL MEDICINE

## 2024-04-06 PROCEDURE — 83520 IMMUNOASSAY QUANT NOS NONAB: CPT | Performed by: INTERNAL MEDICINE

## 2024-04-06 PROCEDURE — 84481 FREE ASSAY (FT-3): CPT | Performed by: INTERNAL MEDICINE

## 2024-04-06 PROCEDURE — 84436 ASSAY OF TOTAL THYROXINE: CPT | Performed by: INTERNAL MEDICINE

## 2024-04-06 PROCEDURE — 84443 ASSAY THYROID STIM HORMONE: CPT | Performed by: INTERNAL MEDICINE

## 2024-04-09 LAB
TSH RECEP AB SER-ACNC: <1.1 IU/L (ref 0–1.75)
TSI SER-ACNC: 0.18 IU/L (ref 0–0.55)

## 2024-04-10 DIAGNOSIS — E05.90 HYPERTHYROIDISM: Primary | ICD-10-CM

## 2024-06-10 ENCOUNTER — TELEPHONE (OUTPATIENT)
Age: 29
End: 2024-06-10

## 2024-06-10 NOTE — TELEPHONE ENCOUNTER
Patient called she  needs a script  sent  over to  billing department   for  her  Tsh T3 T4. Because  she  is pregnant  and Dr Styles  is monitoring  her Thyroid  and having them done often they will summit it to her insurance.

## 2024-06-11 NOTE — TELEPHONE ENCOUNTER
Patient states her last lab order for Thyroid didn't have that she was pregnant as the dx code so insurance will cover it. She was told the new script has to be submitted to [facility] billing and they will submit to insurance.  She was told by Neeru that all doctors know how to do this?

## 2024-06-12 DIAGNOSIS — O99.282 HYPERTHYROIDISM IN PREGNANCY, ANTEPARTUM, SECOND TRIMESTER: Primary | ICD-10-CM

## 2024-06-12 DIAGNOSIS — E05.90 HYPERTHYROIDISM IN PREGNANCY, ANTEPARTUM, SECOND TRIMESTER: Primary | ICD-10-CM

## 2024-06-12 NOTE — TELEPHONE ENCOUNTER
I don't know how to submit this to [facility]'s billing.  I can make sure the diagnosis codes are appropriate.

## 2024-06-12 NOTE — TELEPHONE ENCOUNTER
I think she already had the labs done and so they need the script submitted. Not sure if this is something Sheree can help with?

## 2024-09-04 DIAGNOSIS — O99.280 HYPERTHYROIDISM AFFECTING PREGNANCY: ICD-10-CM

## 2024-09-04 DIAGNOSIS — E05.90 HYPERTHYROIDISM AFFECTING PREGNANCY: ICD-10-CM

## 2024-09-04 RX ORDER — METHIMAZOLE 5 MG/1
TABLET ORAL
Qty: 90 TABLET | Refills: 0 | Status: SHIPPED | OUTPATIENT
Start: 2024-09-04

## 2024-09-30 DIAGNOSIS — E05.90 HYPERTHYROIDISM AFFECTING PREGNANCY, ANTEPARTUM: Primary | ICD-10-CM

## 2024-09-30 DIAGNOSIS — O99.280 HYPERTHYROIDISM AFFECTING PREGNANCY, ANTEPARTUM: Primary | ICD-10-CM

## 2024-10-01 ENCOUNTER — TELEPHONE (OUTPATIENT)
Age: 29
End: 2024-10-01

## 2024-10-01 NOTE — TELEPHONE ENCOUNTER
Pt is requesting labs be faxed to her lab    She is going this am.  Pt has a 9am appt    [facility]   Fax #[phone]

## 2024-10-09 ENCOUNTER — TELEPHONE (OUTPATIENT)
Dept: ENDOCRINOLOGY | Facility: CLINIC | Age: 29
End: 2024-10-09

## 2024-10-09 NOTE — TELEPHONE ENCOUNTER
----- Message from Yesy ARROYO sent at 10/9/2024  7:24 AM EDT -----  Please call to schedule appt  ----- Message -----  From: Mannie, Transcription Incoming  Sent: 10/8/2024   7:11 PM EDT  To: #

## 2024-12-08 DIAGNOSIS — O99.280 HYPERTHYROIDISM AFFECTING PREGNANCY: ICD-10-CM

## 2024-12-08 DIAGNOSIS — E05.90 HYPERTHYROIDISM AFFECTING PREGNANCY: ICD-10-CM

## 2024-12-10 RX ORDER — METHIMAZOLE 5 MG/1
TABLET ORAL
Qty: 90 TABLET | Refills: 0 | Status: SHIPPED | OUTPATIENT
Start: 2024-12-10

## 2024-12-30 ENCOUNTER — TELEPHONE (OUTPATIENT)
Age: 29
End: 2024-12-30

## 2024-12-30 NOTE — TELEPHONE ENCOUNTER
Patient calling requesting if the lab orders that need to be completed prior to her next appointment can be faxed to the lab that she is currently near in [place].     Lab Fax:   [phone]    Patient stated someone from our office had called her as they had written the fax down incorrectly before and needed clarification on the number. Unable to find documentation of this encounter to tack on .     Patient is going this afternoon to get lab work done. Asking if we can fax this ASAP.

## 2025-02-06 DIAGNOSIS — E05.90 HYPERTHYROIDISM AFFECTING PREGNANCY: ICD-10-CM

## 2025-02-06 DIAGNOSIS — O99.280 HYPERTHYROIDISM AFFECTING PREGNANCY: ICD-10-CM

## 2025-02-06 RX ORDER — METHIMAZOLE 5 MG/1
TABLET ORAL
Qty: 90 TABLET | Refills: 0 | Status: SHIPPED | OUTPATIENT
Start: 2025-02-06 | End: 2025-02-06 | Stop reason: SDUPTHER

## 2025-02-06 RX ORDER — METHIMAZOLE 5 MG/1
TABLET ORAL
Qty: 90 TABLET | Refills: 1 | Status: SHIPPED | OUTPATIENT
Start: 2025-02-06

## 2025-04-29 ENCOUNTER — TELEPHONE (OUTPATIENT)
Age: 30
End: 2025-04-29

## 2025-04-29 NOTE — TELEPHONE ENCOUNTER
Patient calling in regard to appointment that needed rescheduled    Patient scheduled for Friday 5/2    Requesting lab orders be placed asap so she can complete tomorrow    Please advise asap!

## 2025-04-30 DIAGNOSIS — E05.90 HYPERTHYROIDISM: Primary | ICD-10-CM

## 2025-05-02 ENCOUNTER — OFFICE VISIT (OUTPATIENT)
Dept: ENDOCRINOLOGY | Facility: CLINIC | Age: 30
End: 2025-05-02
Payer: COMMERCIAL

## 2025-05-02 VITALS
HEIGHT: 64 IN | SYSTOLIC BLOOD PRESSURE: 118 MMHG | DIASTOLIC BLOOD PRESSURE: 78 MMHG | OXYGEN SATURATION: 98 % | HEART RATE: 89 BPM | WEIGHT: 166 LBS | BODY MASS INDEX: 28.34 KG/M2 | RESPIRATION RATE: 18 BRPM

## 2025-05-02 DIAGNOSIS — O99.280 HYPERTHYROIDISM AFFECTING PREGNANCY: ICD-10-CM

## 2025-05-02 DIAGNOSIS — E05.90 HYPERTHYROIDISM: Primary | ICD-10-CM

## 2025-05-02 DIAGNOSIS — E05.90 HYPERTHYROIDISM AFFECTING PREGNANCY: ICD-10-CM

## 2025-05-02 PROCEDURE — 99214 OFFICE O/P EST MOD 30 MIN: CPT

## 2025-05-02 RX ORDER — METHIMAZOLE 5 MG/1
TABLET ORAL
Qty: 90 TABLET | Refills: 4 | Status: SHIPPED | OUTPATIENT
Start: 2025-05-02

## 2025-05-02 NOTE — PROGRESS NOTES
Name: Tarsha De La Fuente      : 1995      MRN: [id number]  Encounter Provider: MALORIE Frederick  Encounter Date: 2025   Encounter department: [facility] [place]    Chief Complaint   Patient presents with    Hyperthyroidism     History of Present Illness   History of Present Illness  The patient is a 30-year-old female who presents for evaluation of hyperthyroidism due to Graves' disease. She was last seen in the office by Dr. Styles on 2023. At that time, she was biochemically stable. She is currently taking methimazole 5 mg daily.    She reports feeling well overall and continues her daily regimen of methimazole 5 mg. She has been under the care of Dr. Styles for her thyroid condition since before her pregnancy and has chosen to continue with his management post-pregnancy. She typically undergoes laboratory testing every 4 months. She is seeking refills of her prescription and a rolling prescription for laboratory work. She also requests a comprehensive check of all her labs. She has been on medication since , with a pattern of requiring no medication by the end of the first or second trimester of pregnancy, and then resuming a low dose at delivery, which she maintains until the next pregnancy.    She gave birth in 2024 and has been closely monitoring her thyroid function. Interestingly, she notes an improvement in her thyroid condition following her first pregnancy, attributing this to the effect of pregnancy hormones. She is currently contemplating further pregnancies.    She does not experience any difficulty swallowing, breathing, or change in voice. She has annual ophthalmology appointments and reports no changes in her vision.    MEDICATIONS  methimazole    Assessment & Plan  1. Hyperthyroidism due to Graves' disease.  Her laboratory results are within normal limits, indicating effective management of her condition with the current  "medication regimen. She will continue her daily intake of methimazole 5 mg. A prescription refill for methimazole 5 mg has been provided, sufficient for 90 days with four refills. A rolling prescription for laboratory work has also been issued. She is advised to undergo laboratory testing every four months, or sooner if any symptoms arise. If she is in [place], she can always do telemedicine as long as she is getting her labs done and sending us messages.    2. Potential Hashimoto's disease.  There is a possibility that she may have tested positive for Hashimoto's disease in the past. TSI was negative in October 2024 and June 2022. A TPO test will be conducted during her next laboratory work to confirm this. If the TPO test returns positive, it will be necessary to monitor for potential hypothyroidism.        Review of Systems   Constitutional:  Negative for chills and fever.   HENT:  Negative for ear pain and sore throat.    Eyes:  Negative for pain and visual disturbance.   Respiratory:  Negative for cough and shortness of breath.    Cardiovascular:  Negative for chest pain and palpitations.   Gastrointestinal:  Negative for abdominal pain and vomiting.   Genitourinary:  Negative for dysuria and hematuria.   Musculoskeletal:  Negative for arthralgias and back pain.   Skin:  Negative for color change and rash.   Neurological:  Negative for seizures and syncope.   All other systems reviewed and are negative.   as per HPI       Medical History Reviewed by provider this encounter:     .    Objective   /78 (BP Location: Left arm, Patient Position: Sitting, Cuff Size: Adult)   Pulse 89   Resp 18   Ht 5' 4\" (1.626 m)   Wt 75.3 kg (166 lb)   SpO2 98%   BMI 28.49 kg/m²      Body mass index is 28.49 kg/m².  Wt Readings from Last 3 Encounters:   05/02/25 75.3 kg (166 lb)   08/05/22 82.6 kg (182 lb)   04/13/22 87.8 kg (193 lb 9.6 oz)     Physical Exam  Vitals reviewed.   HENT:      Head: Normocephalic and " "atraumatic.   Cardiovascular:      Rate and Rhythm: Normal rate.   Pulmonary:      Effort: Pulmonary effort is normal.   Neurological:      Mental Status: She is alert.       Physical Exam  Thyroid is slightly full.      Results  Laboratory Studies  TSI was negative in October 2024 and June 2022.  Labs:   No results found for: \"HGBA1C\"  Lab Results   Component Value Date    CREATININE 0.68 05/26/2021    BUN 13 05/26/2021    K 4.0 05/26/2021     (H) 05/26/2021    CO2 26 05/26/2021     eGFR   Date Value Ref Range Status   05/26/2021 120 ml/min/1.73sq m Final     No results found for: \"CHOL\", \"HDL\", \"LDL\", \"TRIG\", \"CHOLHDL\"  Lab Results   Component Value Date    ALT 45 05/26/2021    AST 22 05/26/2021    ALKPHOS 96 05/26/2021     Lab Results   Component Value Date    XEW4SITTFHLB 1.879 04/06/2024    UMD6BPAZLCIN 1.680 02/10/2022    GVW0PLMSWHYN <0.007 (L) 05/26/2021     Lab Results   Component Value Date    FREET4 0.43 (L) 04/06/2024       There are no Patient Instructions on file for this visit.    Discussed with the patient and all questioned fully answered. She will call me if any problems arise.  "

## 2025-05-06 ENCOUNTER — RESULTS FOLLOW-UP (OUTPATIENT)
Dept: ENDOCRINOLOGY | Facility: CLINIC | Age: 30
End: 2025-05-06